# Patient Record
Sex: MALE | Race: WHITE | Employment: OTHER | URBAN - METROPOLITAN AREA
[De-identification: names, ages, dates, MRNs, and addresses within clinical notes are randomized per-mention and may not be internally consistent; named-entity substitution may affect disease eponyms.]

---

## 2017-02-09 RX ORDER — POTASSIUM CHLORIDE 20 MEQ/1
TABLET, EXTENDED RELEASE ORAL
Qty: 1080 TABLET | Refills: 1 | Status: SHIPPED | OUTPATIENT
Start: 2017-02-09 | End: 2017-02-24

## 2017-02-13 RX ORDER — SPIRONOLACTONE 25 MG/1
25 TABLET ORAL DAILY
Qty: 90 TABLET | Refills: 3 | Status: SHIPPED | OUTPATIENT
Start: 2017-02-13 | End: 2017-04-20 | Stop reason: ALTCHOICE

## 2017-02-24 RX ORDER — POTASSIUM CHLORIDE 20 MEQ/1
TABLET, EXTENDED RELEASE ORAL
Qty: 1080 TABLET | Refills: 1 | Status: SHIPPED | OUTPATIENT
Start: 2017-02-24 | End: 2017-07-19

## 2017-05-03 ENCOUNTER — OFFICE VISIT (OUTPATIENT)
Dept: NEPHROLOGY | Facility: CLINIC | Age: 62
End: 2017-05-03

## 2017-05-03 VITALS
RESPIRATION RATE: 16 BRPM | SYSTOLIC BLOOD PRESSURE: 128 MMHG | HEART RATE: 80 BPM | WEIGHT: 138 LBS | DIASTOLIC BLOOD PRESSURE: 80 MMHG | BODY MASS INDEX: 26 KG/M2

## 2017-05-03 DIAGNOSIS — E87.6 HYPOKALEMIA: ICD-10-CM

## 2017-05-03 DIAGNOSIS — E26.81 BARTTER SYNDROME (HCC): Primary | ICD-10-CM

## 2017-05-03 DIAGNOSIS — E83.39 HYPOPHOSPHATEMIA: ICD-10-CM

## 2017-05-03 PROCEDURE — 99214 OFFICE O/P EST MOD 30 MIN: CPT | Performed by: INTERNAL MEDICINE

## 2017-05-03 NOTE — PROGRESS NOTES
Nephrology Progress Note      Jen Laura is a 64year old male. HPI:   Patient presents with:  Kidney Problem      Mr. 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 Disp: 1 Box Rfl: 2   ALPRAZolam 0.25 MG Oral Tab Take 1 tablet (0.25 mg total) by mouth 3 (three) times daily as needed.  Disp: 90 tablet Rfl: 0   Potassium Chloride ER (KLOR-CON M20) 20 MEQ Oral Tab CR TAKE 3 TABLETS BY MOUTH    4 TIMES A DAY Disp: 1080 ta organomegaly  Extremities: Without clubbing, cyanosis or edema.   Neurologic: Alert and oriented, normal affect, cranial nerves grossly intact, moving all extremities  Skin: Warm and dry, no rashes      LABS:       Lab Results  Component Value Date   BUN 15

## 2017-07-19 RX ORDER — POTASSIUM CHLORIDE 20 MEQ/1
TABLET, EXTENDED RELEASE ORAL
Qty: 1080 TABLET | Refills: 1 | Status: SHIPPED | OUTPATIENT
Start: 2017-07-19 | End: 2018-01-29

## 2017-09-29 PROCEDURE — 82043 UR ALBUMIN QUANTITATIVE: CPT | Performed by: FAMILY MEDICINE

## 2017-09-29 PROCEDURE — 82570 ASSAY OF URINE CREATININE: CPT | Performed by: FAMILY MEDICINE

## 2018-01-09 PROCEDURE — 82570 ASSAY OF URINE CREATININE: CPT | Performed by: INTERNAL MEDICINE

## 2018-01-09 PROCEDURE — 82043 UR ALBUMIN QUANTITATIVE: CPT | Performed by: INTERNAL MEDICINE

## 2018-02-02 RX ORDER — SPIRONOLACTONE 25 MG/1
25 TABLET ORAL DAILY
Qty: 90 TABLET | Refills: 1 | Status: SHIPPED | OUTPATIENT
Start: 2018-02-02 | End: 2018-07-09

## 2018-02-02 RX ORDER — POTASSIUM CHLORIDE 20 MEQ/1
TABLET, EXTENDED RELEASE ORAL
Qty: 1080 TABLET | Refills: 1 | Status: SHIPPED | OUTPATIENT
Start: 2018-02-02 | End: 2018-07-09

## 2018-02-07 ENCOUNTER — OFFICE VISIT (OUTPATIENT)
Dept: NEPHROLOGY | Facility: CLINIC | Age: 63
End: 2018-02-07

## 2018-02-07 VITALS — DIASTOLIC BLOOD PRESSURE: 80 MMHG | SYSTOLIC BLOOD PRESSURE: 110 MMHG | WEIGHT: 136 LBS | BODY MASS INDEX: 26 KG/M2

## 2018-02-07 DIAGNOSIS — E83.39 HYPOPHOSPHATEMIA: ICD-10-CM

## 2018-02-07 DIAGNOSIS — E87.6 HYPOKALEMIA: ICD-10-CM

## 2018-02-07 DIAGNOSIS — E26.81 BARTTER SYNDROME (HCC): Primary | ICD-10-CM

## 2018-02-07 PROCEDURE — 99214 OFFICE O/P EST MOD 30 MIN: CPT | Performed by: INTERNAL MEDICINE

## 2018-02-07 NOTE — PROGRESS NOTES
Nephrology Progress Note      Shana Ureña is a 58year old male.     HPI:   Patient presents with:  Kidney Problem  Hypokalemia    Mr. Rosi Alan was seen in the nephrology clinic today in follow-up for management of electrolyte abnormalities related to [facility] HYDROcodone-acetaminophen  MG Oral Tab  Disp:  Rfl:    Fluticasone Propionate 50 MCG/ACT Nasal Suspension 1 spray by Nasal route daily.  Disp: 3 Bottle Rfl: 1   spironolactone 25 MG Oral Tab  Disp:  Rfl:    Albuterol Sulfate HFA (PROAIR HFA) 108 (90 Warm and dry, no rashes      LABS:       Lab Results  Component Value Date   BUN 20 09/29/2017   CREATSERUM 1.07 09/29/2017   GFR 76 11/25/2014   ALKPHO 48 09/29/2017   AST 18 09/29/2017   ALT 31 09/29/2017   BILT 0.90 09/29/2017   TP 6.9 09/29/2017   ALB

## 2018-07-09 RX ORDER — SPIRONOLACTONE 25 MG/1
25 TABLET ORAL DAILY
Qty: 90 TABLET | Refills: 1 | Status: SHIPPED | OUTPATIENT
Start: 2018-07-09 | End: 2019-01-17

## 2018-07-09 RX ORDER — POTASSIUM CHLORIDE 20 MEQ/1
60 TABLET, EXTENDED RELEASE ORAL 4 TIMES DAILY
Qty: 1080 TABLET | Refills: 1 | Status: SHIPPED | OUTPATIENT
Start: 2018-07-09 | End: 2019-01-17

## 2018-11-06 PROCEDURE — 84100 ASSAY OF PHOSPHORUS: CPT | Performed by: FAMILY MEDICINE

## 2018-11-07 ENCOUNTER — OFFICE VISIT (OUTPATIENT)
Dept: NEPHROLOGY | Facility: CLINIC | Age: 63
End: 2018-11-07
Payer: MEDICARE

## 2018-11-07 VITALS — BODY MASS INDEX: 26 KG/M2 | WEIGHT: 136 LBS | SYSTOLIC BLOOD PRESSURE: 102 MMHG | DIASTOLIC BLOOD PRESSURE: 64 MMHG

## 2018-11-07 DIAGNOSIS — E87.6 HYPOKALEMIA: ICD-10-CM

## 2018-11-07 DIAGNOSIS — E26.81 BARTTER SYNDROME (HCC): Primary | ICD-10-CM

## 2018-11-07 DIAGNOSIS — E83.39 HYPOPHOSPHATEMIA: ICD-10-CM

## 2018-11-07 PROCEDURE — 99214 OFFICE O/P EST MOD 30 MIN: CPT | Performed by: INTERNAL MEDICINE

## 2018-11-07 RX ORDER — LISINOPRIL 10 MG/1
10 TABLET ORAL DAILY
COMMUNITY
End: 2019-02-07

## 2018-11-07 NOTE — PROGRESS NOTES
Nephrology Progress Note      John Rain is a 61year old male. HPI:   Patient presents with:   Other: Bartter's Syndrome      Mr. Christiano Gimenez was seen in the nephrology clinic today in follow-up for management of electrolyte abnormalities related to [facility] Tab CR Take 3 tablets (60 mEq total) by mouth 4 (four) times daily. Disp: 1080 tablet Rfl: 1   spironolactone 25 MG Oral Tab Take 1 tablet (25 mg total) by mouth daily.  Disp: 90 tablet Rfl: 1   Pravastatin Sodium 20 MG Oral Tab Take 1 tablet (20 mg total) without wheezes, rales, rhonchi. Abdomen: Soft, non-tender. + bowel sounds, no palpable organomegaly  Extremities: Without clubbing, cyanosis or edema.   Prominence noted over the mid biceps on the right, there is mild discoloration of the area a yellowi

## 2019-01-17 RX ORDER — SPIRONOLACTONE 25 MG/1
TABLET ORAL
Qty: 90 TABLET | Refills: 1 | Status: SHIPPED | OUTPATIENT
Start: 2019-01-17 | End: 2019-08-01

## 2019-01-17 RX ORDER — POTASSIUM CHLORIDE 20 MEQ/1
TABLET, EXTENDED RELEASE ORAL
Qty: 1080 TABLET | Refills: 1 | Status: SHIPPED | OUTPATIENT
Start: 2019-01-17 | End: 2020-11-19

## 2019-07-14 RX ORDER — SPIRONOLACTONE 25 MG/1
TABLET ORAL
Qty: 90 TABLET | Refills: 0 | Status: SHIPPED | OUTPATIENT
Start: 2019-07-14 | End: 2019-09-22

## 2019-07-14 RX ORDER — POTASSIUM CHLORIDE 20 MEQ/1
TABLET, EXTENDED RELEASE ORAL
Qty: 1080 TABLET | Refills: 0 | Status: SHIPPED | OUTPATIENT
Start: 2019-07-14 | End: 2019-08-01

## 2019-08-15 ENCOUNTER — APPOINTMENT (OUTPATIENT)
Dept: LAB | Age: 64
End: 2019-08-15
Attending: INTERNAL MEDICINE
Payer: MEDICARE

## 2019-08-15 DIAGNOSIS — I10 ESSENTIAL HYPERTENSION: ICD-10-CM

## 2019-08-15 DIAGNOSIS — E26.81 BARTTER SYNDROME (HCC): ICD-10-CM

## 2019-08-15 DIAGNOSIS — E87.6 HYPOKALEMIA: ICD-10-CM

## 2019-08-15 DIAGNOSIS — E83.39 HYPOPHOSPHATEMIA: ICD-10-CM

## 2019-08-15 DIAGNOSIS — E78.5 DYSLIPIDEMIA: ICD-10-CM

## 2019-08-15 LAB
ALBUMIN SERPL-MCNC: 3.5 G/DL (ref 3.4–5)
ALBUMIN/GLOB SERPL: 1.1 {RATIO} (ref 1–2)
ALP LIVER SERPL-CCNC: 52 U/L (ref 45–117)
ALT SERPL-CCNC: 40 U/L (ref 16–61)
ANION GAP SERPL CALC-SCNC: 6 MMOL/L (ref 0–18)
AST SERPL-CCNC: 20 U/L (ref 15–37)
BILIRUB SERPL-MCNC: 0.6 MG/DL (ref 0.1–2)
BUN BLD-MCNC: 23 MG/DL (ref 7–18)
BUN/CREAT SERPL: 20.7 (ref 10–20)
CALCIUM BLD-MCNC: 8.9 MG/DL (ref 8.5–10.1)
CHLORIDE SERPL-SCNC: 105 MMOL/L (ref 98–112)
CO2 SERPL-SCNC: 29 MMOL/L (ref 21–32)
CREAT BLD-MCNC: 1.11 MG/DL (ref 0.7–1.3)
GLOBULIN PLAS-MCNC: 3.3 G/DL (ref 2.8–4.4)
GLUCOSE BLD-MCNC: 88 MG/DL (ref 70–99)
HAV IGM SER QL: 1.2 MG/DL (ref 1.6–2.6)
M PROTEIN MFR SERPL ELPH: 6.8 G/DL (ref 6.4–8.2)
OSMOLALITY SERPL CALC.SUM OF ELEC: 293 MOSM/KG (ref 275–295)
PHOSPHATE SERPL-MCNC: 2.6 MG/DL (ref 2.5–4.9)
POTASSIUM SERPL-SCNC: 3.6 MMOL/L (ref 3.5–5.1)
SODIUM SERPL-SCNC: 140 MMOL/L (ref 136–145)

## 2019-08-15 PROCEDURE — 80053 COMPREHEN METABOLIC PANEL: CPT

## 2019-08-15 PROCEDURE — 80061 LIPID PANEL: CPT

## 2019-08-15 PROCEDURE — 84100 ASSAY OF PHOSPHORUS: CPT

## 2019-08-15 PROCEDURE — 83735 ASSAY OF MAGNESIUM: CPT

## 2019-08-21 ENCOUNTER — OFFICE VISIT (OUTPATIENT)
Dept: NEPHROLOGY | Facility: CLINIC | Age: 64
End: 2019-08-21
Payer: MEDICARE

## 2019-08-21 VITALS — BODY MASS INDEX: 24 KG/M2 | WEIGHT: 132 LBS | DIASTOLIC BLOOD PRESSURE: 72 MMHG | SYSTOLIC BLOOD PRESSURE: 116 MMHG

## 2019-08-21 DIAGNOSIS — E87.6 HYPOKALEMIA: ICD-10-CM

## 2019-08-21 DIAGNOSIS — E83.39 HYPOPHOSPHATEMIA: ICD-10-CM

## 2019-08-21 DIAGNOSIS — E26.81 BARTTER SYNDROME (HCC): Primary | ICD-10-CM

## 2019-08-21 PROCEDURE — 99214 OFFICE O/P EST MOD 30 MIN: CPT | Performed by: INTERNAL MEDICINE

## 2019-08-21 NOTE — PROGRESS NOTES
Nephrology Progress Note      Jhonathan Xiong is a 59year old male. HPI:   Patient presents with:   Other: Bartter's Syndrome      Mr. Joseph Machuca was seen in the nephrology clinic today in follow-up for management of electro light abnormalities related to Bartter Disp: 90 tablet Rfl: 3   KLOR-CON M20 20 MEQ Oral Tab CR TAKE 3 TABLETS 4 TIMES A   DAY Disp: 1080 tablet Rfl: 1   Magnesium Chloride 64 MG Oral Tab EC Take 2 tablets by mouth 4 (four) times daily.    Disp:  Rfl:    baclofen 10 MG Oral Tab Take 0.5 tablets 76 11/25/2014    GFRNAA 70 08/15/2019    GFRAA 81 08/15/2019    CA 8.9 08/15/2019    OSMOCALC 293 08/15/2019    ALKPHO 52 08/15/2019    AST 20 08/15/2019    ALT 40 08/15/2019    BILT 0.6 08/15/2019    TP 6.8 08/15/2019    ALB 3.5 08/15/2019    GLOBULIN 3.3

## 2019-09-23 RX ORDER — POTASSIUM CHLORIDE 20 MEQ/1
TABLET, EXTENDED RELEASE ORAL
Qty: 1080 TABLET | Refills: 1 | Status: SHIPPED | OUTPATIENT
Start: 2019-09-23 | End: 2020-02-18

## 2019-09-23 RX ORDER — SPIRONOLACTONE 25 MG/1
TABLET ORAL
Qty: 90 TABLET | Refills: 1 | Status: SHIPPED | OUTPATIENT
Start: 2019-09-23 | End: 2020-08-19

## 2019-11-15 PROBLEM — E26.81 BARTTER'S SYNDROME (HCC): Status: ACTIVE | Noted: 2019-11-15

## 2020-05-12 DIAGNOSIS — E26.81: Primary | ICD-10-CM

## 2020-05-18 ENCOUNTER — VIRTUAL PHONE E/M (OUTPATIENT)
Dept: NEPHROLOGY | Facility: CLINIC | Age: 65
End: 2020-05-18
Payer: MEDICARE

## 2020-05-18 DIAGNOSIS — E83.39 HYPOPHOSPHATEMIA: ICD-10-CM

## 2020-05-18 DIAGNOSIS — E26.81 BARTTER SYNDROME (HCC): Primary | ICD-10-CM

## 2020-05-18 DIAGNOSIS — E87.6 HYPOKALEMIA: ICD-10-CM

## 2020-05-18 PROCEDURE — 99442 PHONE E/M BY PHYS 11-20 MIN: CPT | Performed by: INTERNAL MEDICINE

## 2020-05-18 NOTE — PROGRESS NOTES
I called pt this AM and performed virtual office visit. Time spent 15 minutes. He reports that he has been feeling very well overall. No recent medication changes. Labs reviewed and K is stable. Mag and phos not done.   Advised to continue current meds

## 2020-08-19 RX ORDER — SPIRONOLACTONE 25 MG/1
25 TABLET ORAL DAILY
Qty: 30 TABLET | Refills: 0 | Status: SHIPPED | OUTPATIENT
Start: 2020-08-19 | End: 2020-09-10

## 2020-09-10 RX ORDER — SPIRONOLACTONE 25 MG/1
25 TABLET ORAL DAILY
Qty: 90 TABLET | Refills: 1 | Status: SHIPPED | OUTPATIENT
Start: 2020-09-10 | End: 2020-09-23

## 2020-09-23 RX ORDER — SPIRONOLACTONE 25 MG/1
25 TABLET ORAL DAILY
Qty: 90 TABLET | Refills: 1 | Status: SHIPPED | OUTPATIENT
Start: 2020-09-23 | End: 2021-04-05

## 2020-11-19 RX ORDER — POTASSIUM CHLORIDE 20 MEQ/1
TABLET, EXTENDED RELEASE ORAL
Qty: 1080 TABLET | Refills: 1 | Status: SHIPPED | OUTPATIENT
Start: 2020-11-19 | End: 2021-05-12

## 2021-04-05 RX ORDER — SPIRONOLACTONE 25 MG/1
TABLET ORAL
Qty: 90 TABLET | Refills: 1 | Status: SHIPPED | OUTPATIENT
Start: 2021-04-05 | End: 2021-04-23

## 2021-04-28 ENCOUNTER — TELEPHONE (OUTPATIENT)
Dept: NEPHROLOGY | Facility: CLINIC | Age: 66
End: 2021-04-28

## 2021-04-29 ENCOUNTER — TELEPHONE (OUTPATIENT)
Dept: NEPHROLOGY | Facility: CLINIC | Age: 66
End: 2021-04-29

## 2021-04-29 NOTE — TELEPHONE ENCOUNTER
Called and LVM to advise that per Dr. Blanca Ballard no additional labs are needed.  March labs are sufficient

## 2021-05-03 ENCOUNTER — OFFICE VISIT (OUTPATIENT)
Dept: NEPHROLOGY | Facility: CLINIC | Age: 66
End: 2021-05-03
Payer: MEDICARE

## 2021-05-03 VITALS — WEIGHT: 143 LBS | SYSTOLIC BLOOD PRESSURE: 112 MMHG | DIASTOLIC BLOOD PRESSURE: 72 MMHG | BODY MASS INDEX: 26 KG/M2

## 2021-05-03 DIAGNOSIS — E26.81 BARTTER SYNDROME (HCC): Primary | ICD-10-CM

## 2021-05-03 DIAGNOSIS — E83.39 HYPOPHOSPHATEMIA: ICD-10-CM

## 2021-05-03 DIAGNOSIS — E87.6 HYPOKALEMIA: ICD-10-CM

## 2021-05-03 PROCEDURE — 99214 OFFICE O/P EST MOD 30 MIN: CPT | Performed by: INTERNAL MEDICINE

## 2021-05-03 NOTE — PROGRESS NOTES
Nephrology Progress Note      Marilu You is a 72year old male. HPI:   Patient presents with:   Other: Bartter Syndrome, hypophosphatemia  Hypokalemia      Mr. Pichardo Evelia was seen in the nephrology clinic today in follow-up for management of electrolyte abnor tablet (0.25 mg total) by mouth 3 (three) times daily as needed. 90 tablet 0   • Levocetirizine Dihydrochloride 5 MG Oral Tab Take 1 tablet (5 mg total) by mouth every evening.  90 tablet 0   • KLOR-CON M20 20 MEQ Oral Tab CR TAKE 3 TABLETS 4 TIMES A   DAY rhonchi. Abdomen: Soft, non-tender. + bowel sounds, no palpable organomegaly  Extremities: Without clubbing, cyanosis or edema.   Neurologic: Alert and oriented, normal affect, cranial nerves grossly intact, moving all extremities  Skin: Warm and dry, no and renal protective effects and we will continue to take this medication as prescribed.       Blayne Lawson MD  5/3/2021  10:56 AM

## 2021-05-12 RX ORDER — POTASSIUM CHLORIDE 20 MEQ/1
60 TABLET, EXTENDED RELEASE ORAL 4 TIMES DAILY
Qty: 1080 TABLET | Refills: 1 | Status: SHIPPED | OUTPATIENT
Start: 2021-05-12 | End: 2021-06-19

## 2021-06-19 RX ORDER — POTASSIUM CHLORIDE 20 MEQ/1
60 TABLET, EXTENDED RELEASE ORAL 4 TIMES DAILY
Qty: 1080 TABLET | Refills: 1 | Status: SHIPPED | OUTPATIENT
Start: 2021-06-19 | End: 2022-01-19

## 2021-11-23 RX ORDER — SPIRONOLACTONE 25 MG/1
25 TABLET ORAL DAILY
Qty: 90 TABLET | Refills: 1 | Status: SHIPPED | OUTPATIENT
Start: 2021-11-23 | End: 2022-01-19

## 2021-11-30 RX ORDER — MAGNESIUM CHLORIDE 64 MG
2 TABLET, DELAYED RELEASE (ENTERIC COATED) ORAL 4 TIMES DAILY
Qty: 720 TABLET | Refills: 1 | Status: SHIPPED | OUTPATIENT
Start: 2021-11-30

## 2022-01-19 DIAGNOSIS — I10 ESSENTIAL HYPERTENSION: ICD-10-CM

## 2022-01-19 DIAGNOSIS — E87.6 HYPOKALEMIA: ICD-10-CM

## 2022-01-19 DIAGNOSIS — E83.39 HYPOPHOSPHATEMIA: ICD-10-CM

## 2022-01-19 DIAGNOSIS — E26.81 BARTTER SYNDROME (HCC): Primary | ICD-10-CM

## 2022-01-19 RX ORDER — SPIRONOLACTONE 25 MG/1
25 TABLET ORAL DAILY
Qty: 90 TABLET | Refills: 1 | Status: SHIPPED | OUTPATIENT
Start: 2022-01-19 | End: 2022-07-18

## 2022-01-19 RX ORDER — POTASSIUM CHLORIDE 20 MEQ/1
60 TABLET, EXTENDED RELEASE ORAL 4 TIMES DAILY
Qty: 1080 TABLET | Refills: 1 | Status: SHIPPED | OUTPATIENT
Start: 2022-01-19

## 2022-01-19 NOTE — TELEPHONE ENCOUNTER
Pt is calling requesting refills on potassium chloride er 20 MEQ and spironalactone 25 MG, he would like 90 day supply to be sent to [facility].  Sent to Dr Poncho Hankins for approval.

## 2022-05-02 ENCOUNTER — OFFICE VISIT (OUTPATIENT)
Dept: NEPHROLOGY | Facility: CLINIC | Age: 67
End: 2022-05-02
Payer: COMMERCIAL

## 2022-05-02 VITALS — DIASTOLIC BLOOD PRESSURE: 76 MMHG | WEIGHT: 143 LBS | BODY MASS INDEX: 26 KG/M2 | SYSTOLIC BLOOD PRESSURE: 130 MMHG

## 2022-05-02 DIAGNOSIS — E83.39 HYPOPHOSPHATEMIA: ICD-10-CM

## 2022-05-02 DIAGNOSIS — I10 ESSENTIAL HYPERTENSION: ICD-10-CM

## 2022-05-02 DIAGNOSIS — E87.6 HYPOKALEMIA: ICD-10-CM

## 2022-05-02 DIAGNOSIS — E26.81 BARTTER SYNDROME (HCC): Primary | ICD-10-CM

## 2022-05-02 RX ORDER — ALPRAZOLAM 0.25 MG/1
0.25 TABLET ORAL NIGHTLY PRN
COMMUNITY

## 2022-05-02 RX ORDER — ASPIRIN 81 MG/1
81 TABLET ORAL DAILY
COMMUNITY

## 2022-05-02 RX ORDER — LISINOPRIL 5 MG/1
5 TABLET ORAL DAILY
COMMUNITY

## 2022-05-27 DIAGNOSIS — E26.81 BARTTER SYNDROME (HCC): ICD-10-CM

## 2022-05-27 DIAGNOSIS — I10 ESSENTIAL HYPERTENSION: ICD-10-CM

## 2022-05-27 DIAGNOSIS — E87.6 HYPOKALEMIA: ICD-10-CM

## 2022-05-27 RX ORDER — POTASSIUM CHLORIDE 20 MEQ/1
60 TABLET, EXTENDED RELEASE ORAL 4 TIMES DAILY
Qty: 1080 TABLET | Refills: 3 | Status: SHIPPED | OUTPATIENT
Start: 2022-05-27

## 2022-05-27 RX ORDER — SPIRONOLACTONE 25 MG/1
25 TABLET ORAL DAILY
Qty: 90 TABLET | Refills: 3 | Status: SHIPPED | OUTPATIENT
Start: 2022-05-27

## 2023-05-01 ENCOUNTER — OFFICE VISIT (OUTPATIENT)
Dept: NEPHROLOGY | Facility: CLINIC | Age: 68
End: 2023-05-01
Payer: COMMERCIAL

## 2023-05-01 VITALS — BODY MASS INDEX: 26 KG/M2 | SYSTOLIC BLOOD PRESSURE: 112 MMHG | WEIGHT: 141.5 LBS | DIASTOLIC BLOOD PRESSURE: 70 MMHG

## 2023-05-01 DIAGNOSIS — I10 ESSENTIAL HYPERTENSION: ICD-10-CM

## 2023-05-01 DIAGNOSIS — E87.6 HYPOKALEMIA: ICD-10-CM

## 2023-05-01 DIAGNOSIS — E26.81 BARTTER SYNDROME (HCC): Primary | ICD-10-CM

## 2023-05-01 DIAGNOSIS — E83.39 HYPOPHOSPHATEMIA: ICD-10-CM

## 2023-06-17 DIAGNOSIS — I10 ESSENTIAL HYPERTENSION: ICD-10-CM

## 2023-06-17 DIAGNOSIS — E26.81 BARTTER SYNDROME (HCC): ICD-10-CM

## 2023-06-17 DIAGNOSIS — E87.6 HYPOKALEMIA: ICD-10-CM

## 2023-06-20 RX ORDER — POTASSIUM CHLORIDE 20 MEQ/1
60 TABLET, EXTENDED RELEASE ORAL 4 TIMES DAILY
Qty: 1080 TABLET | Refills: 1 | Status: SHIPPED | OUTPATIENT
Start: 2023-06-20

## 2023-06-20 RX ORDER — SPIRONOLACTONE 25 MG/1
25 TABLET ORAL DAILY
Qty: 90 TABLET | Refills: 1 | Status: SHIPPED | OUTPATIENT
Start: 2023-06-20

## 2023-06-22 ENCOUNTER — TELEPHONE (OUTPATIENT)
Dept: NEPHROLOGY | Facility: CLINIC | Age: 68
End: 2023-06-22

## 2023-06-22 DIAGNOSIS — E83.42 HYPOMAGNESEMIA: ICD-10-CM

## 2023-06-22 DIAGNOSIS — E87.6 HYPOKALEMIA: Primary | ICD-10-CM

## 2023-07-06 ENCOUNTER — MED REC SCAN ONLY (OUTPATIENT)
Dept: NEPHROLOGY | Facility: CLINIC | Age: 68
End: 2023-07-06

## 2023-07-31 ENCOUNTER — TELEPHONE (OUTPATIENT)
Dept: NEPHROLOGY | Facility: CLINIC | Age: 68
End: 2023-07-31

## 2023-07-31 NOTE — TELEPHONE ENCOUNTER
I called pt this afternoon. We discussed the results of labs done in early July. He has been feeling \"wiped out\" at times of late. That being said the electrolytes are relatively stable with nl range k and mag consistently a bit low, 1.3 and 1.4 meq/l. Will cont current electrolyte replacement.

## 2023-11-21 DIAGNOSIS — I10 ESSENTIAL HYPERTENSION: ICD-10-CM

## 2023-11-21 DIAGNOSIS — E87.6 HYPOKALEMIA: ICD-10-CM

## 2023-11-21 DIAGNOSIS — E26.81 BARTTER SYNDROME (HCC): ICD-10-CM

## 2023-11-21 RX ORDER — SPIRONOLACTONE 25 MG/1
25 TABLET ORAL DAILY
Qty: 100 TABLET | Refills: 2 | OUTPATIENT
Start: 2023-11-21

## 2023-11-21 RX ORDER — POTASSIUM CHLORIDE 20 MEQ/1
60 TABLET, EXTENDED RELEASE ORAL 4 TIMES DAILY
Refills: 2 | OUTPATIENT
Start: 2023-11-21

## 2024-01-17 DIAGNOSIS — E87.6 HYPOKALEMIA: ICD-10-CM

## 2024-01-17 DIAGNOSIS — E26.81 BARTTER SYNDROME (HCC): ICD-10-CM

## 2024-01-17 DIAGNOSIS — I10 ESSENTIAL HYPERTENSION: ICD-10-CM

## 2024-01-17 RX ORDER — SPIRONOLACTONE 25 MG/1
25 TABLET ORAL DAILY
Qty: 80 TABLET | Refills: 3 | Status: SHIPPED | OUTPATIENT
Start: 2024-01-17

## 2024-01-17 RX ORDER — POTASSIUM CHLORIDE 20 MEQ/1
60 TABLET, EXTENDED RELEASE ORAL 4 TIMES DAILY
Qty: 960 TABLET | Refills: 3 | Status: SHIPPED | OUTPATIENT
Start: 2024-01-17

## 2024-05-02 ENCOUNTER — OFFICE VISIT (OUTPATIENT)
Dept: NEPHROLOGY | Facility: CLINIC | Age: 69
End: 2024-05-02
Payer: COMMERCIAL

## 2024-05-02 VITALS — DIASTOLIC BLOOD PRESSURE: 66 MMHG | BODY MASS INDEX: 27 KG/M2 | WEIGHT: 147.5 LBS | SYSTOLIC BLOOD PRESSURE: 112 MMHG

## 2024-05-02 DIAGNOSIS — E87.6 HYPOKALEMIA: ICD-10-CM

## 2024-05-02 DIAGNOSIS — I10 ESSENTIAL HYPERTENSION: ICD-10-CM

## 2024-05-02 DIAGNOSIS — E26.81 BARTTER SYNDROME (HCC): Primary | ICD-10-CM

## 2024-05-02 DIAGNOSIS — E83.42 HYPOMAGNESEMIA: ICD-10-CM

## 2024-05-02 PROCEDURE — 99214 OFFICE O/P EST MOD 30 MIN: CPT | Performed by: INTERNAL MEDICINE

## 2024-05-02 NOTE — PROGRESS NOTES
Nephrology Progress Note      Danilo Nova is a 68 year old male.    HPI:     Chief Complaint   Patient presents with    Follow - Up     Bartter syndrome     Hypertension       Mr. 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  Since his last clinic visit he has felt relatively well and has not had any recent illnesses, hospitalizations or significant ongoing complaints.  Review of systems is completed and is unremarkable.      HISTORY:  Past Medical History:    Allergic rhinitis    Asthma (HCC)    Essential hypertension    Hyperlipidemia    Osteoarthritis      Past Surgical History:   Procedure Laterality Date    Colonoscopy      Dr. Sam mullins    Colonoscopy  07/15/2019    Inguinal hernia repair Bilateral     Trigger finger release Right 09/2022      Family History   Problem Relation Age of Onset    Skin cancer Mother     High Blood Pressure Mother     Other (lung cancer) Mother     Diabetes Father     Cancer Father         bladder    Heart Attack Other         2 cousins      Social History:   Social History     Socioeconomic History    Marital status:    Tobacco Use    Smoking status: Never    Smokeless tobacco: Never   Vaping Use    Vaping status: Never Used   Substance and Sexual Activity    Alcohol use: Yes     Alcohol/week: 0.0 standard drinks of alcohol     Comment: socially    Drug use: No        Medications (Active prior to today's visit):  Current Outpatient Medications   Medication Sig Dispense Refill    POTASSIUM CHLORIDE 20 MEQ Oral Tab CR TAKE 3 TABLETS BY MOUTH 4 TIMES  DAILY 960 tablet 3    SPIRONOLACTONE 25 MG Oral Tab TAKE 1 TABLET BY MOUTH DAILY 80 tablet 3    ALPRAZolam 0.25 MG Oral Tab Take 1 tablet (0.25 mg total) by mouth nightly as needed.      lisinopril 5 MG Oral Tab Take 1 tablet (5 mg total) by mouth daily.      aspirin 81 MG Oral Tab EC Take 1 tablet (81 mg total) by mouth daily.       LEVOCETIRIZINE 5 MG Oral Tab TAKE 1 TABLET BY MOUTH IN  THE EVENING 90 tablet 0    PRAVASTATIN 20 MG Oral Tab TAKE 1 TABLET BY MOUTH AT  NIGHT 90 tablet 0    ALBUTEROL 108 (90 Base) MCG/ACT Inhalation Aero Soln USE 2 INHALATIONS BY MOUTH  EVERY 6 HOURS AS NEEDED FOR WHEEZING 3 each 0    fluticasone propionate 50 MCG/ACT Nasal Suspension 1 spray by Nasal route daily. 48 mL 3    Magnesium Chloride 64 MG Oral Tab EC Take 2 tablets by mouth 4 (four) times daily. 720 tablet 1    HYDROcodone-acetaminophen  MG Oral Tab TAKE 1 TABLET BY MOUTH EVERY 12 HOURS  0    baclofen 10 MG Oral Tab Take 10 mg by mouth daily. (Patient not taking: Reported on 5/1/2023)  0    Omega-3 Fatty Acids (FISH OIL OR) Take 2,000 mg by mouth daily.        Cholecalciferol (VITAMIN D) 2000 UNITS Oral Tab Take 1 tablet by mouth daily.         Allergies:  Allergies   Allergen Reactions    Levaquin [Levofloxacin]     Penicillins          ROS:     Denies fever/chills  Denies wt loss/gain  Denies HA or visual changes  Denies CP or palpitations  Denies SOB/cough/hemoptysis  Denies abd or flank pain  Denies N/V/D  Denies change in urinary habits or gross hematuria  Denies LE edema  Denies skin rashes/myalgias/arthralgias      PHYSICAL EXAM:   /66 (BP Location: Left arm, Patient Position: Sitting)   Wt 147 lb 8 oz (66.9 kg)   BMI 26.98 kg/m²   Wt Readings from Last 6 Encounters:   05/02/24 147 lb 8 oz (66.9 kg)   05/01/23 141 lb 8 oz (64.2 kg)   05/02/22 143 lb (64.9 kg)   01/13/22 146 lb (66.2 kg)   08/30/21 139 lb (63 kg)   05/27/21 139 lb (63 kg)       General: Alert and oriented in no apparent distress.  HEENT: No scleral icterus, MMM  Neck: Supple, no VALORIE or thyromegaly  Cardiac: Regular rate and rhythm, S1, S2 normal, no murmur or rub  Lungs: Clear without wheezes, rales, rhonchi.    Extremities: Without clubbing, cyanosis or edema.  Neurologic: Alert and oriented, normal affect, cranial nerves grossly intact, moving all  extremities  Skin: Warm and dry, no rashes      LABS:     Labs reviewed from April 26 and include potassium 4.1 mill equivalents per liter, creatinine 0.88 mg/dL  Magnesium 1.3    ASSESSMENT/PLAN:     #1.  Hypokalemia-he has chronic hypokalemia as well as hypomagnesemia and hypophosphatemia related to underlying Bartter's syndrome.  Electrolytes are stable with his current supplementation and overall he has been doing quite well.  Regarding his hypomagnesemia he did back off on his supplementation somewhat due to issues with diarrhea and loose stools and he remained stable in this regard.    #2.  Hypertension-blood pressure has been mildly elevated but has been under good control on low-dose lisinopril at 5 mg daily      Chad Ordaz MD  5/2/2024  9:17 AM         Detail Level: Detailed

## 2024-10-28 DIAGNOSIS — E87.6 HYPOKALEMIA: Primary | ICD-10-CM

## 2024-10-28 DIAGNOSIS — I10 ESSENTIAL HYPERTENSION: ICD-10-CM

## 2024-11-02 DIAGNOSIS — E87.6 HYPOKALEMIA: ICD-10-CM

## 2024-11-02 DIAGNOSIS — E26.81 BARTTER SYNDROME (HCC): ICD-10-CM

## 2024-11-02 DIAGNOSIS — I10 ESSENTIAL HYPERTENSION: ICD-10-CM

## 2024-11-04 RX ORDER — POTASSIUM CHLORIDE 1500 MG/1
60 TABLET, EXTENDED RELEASE ORAL 4 TIMES DAILY
Qty: 1080 TABLET | Refills: 3 | Status: SHIPPED | OUTPATIENT
Start: 2024-11-04

## 2024-11-04 RX ORDER — SPIRONOLACTONE 25 MG/1
25 TABLET ORAL DAILY
Qty: 100 TABLET | Refills: 2 | Status: SHIPPED | OUTPATIENT
Start: 2024-11-04

## 2025-05-01 LAB
BUN: 19 MG/DL (ref 7–25)
CALCIUM: 9.3 MG/DL (ref 8.6–10.3)
CARBON DIOXIDE: 25 MMOL/L (ref 20–32)
CHLORIDE: 104 MMOL/L (ref 98–110)
CREATININE: 0.93 MG/DL (ref 0.7–1.35)
EGFR: 89 ML/MIN/1.73M2
GLUCOSE: 92 MG/DL (ref 65–99)
MAGNESIUM: 1.5 MG/DL (ref 1.5–2.5)
PHOSPHATE (AS PHOSPHORUS): 3.1 MG/DL (ref 2.1–4.3)
POTASSIUM: 4.4 MMOL/L (ref 3.5–5.3)
SODIUM: 138 MMOL/L (ref 135–146)

## 2025-05-05 ENCOUNTER — OFFICE VISIT (OUTPATIENT)
Dept: NEPHROLOGY | Facility: CLINIC | Age: 70
End: 2025-05-05
Payer: COMMERCIAL

## 2025-05-05 VITALS — WEIGHT: 147.38 LBS | DIASTOLIC BLOOD PRESSURE: 78 MMHG | BODY MASS INDEX: 27 KG/M2 | SYSTOLIC BLOOD PRESSURE: 116 MMHG

## 2025-05-05 DIAGNOSIS — I10 ESSENTIAL HYPERTENSION: ICD-10-CM

## 2025-05-05 DIAGNOSIS — E26.81 BARTTER SYNDROME (HCC): Primary | ICD-10-CM

## 2025-05-05 DIAGNOSIS — E83.42 HYPOMAGNESEMIA: ICD-10-CM

## 2025-05-05 DIAGNOSIS — E87.6 HYPOKALEMIA: ICD-10-CM

## 2025-05-05 PROCEDURE — 99214 OFFICE O/P EST MOD 30 MIN: CPT | Performed by: INTERNAL MEDICINE

## 2025-05-05 NOTE — PROGRESS NOTES
Nephrology Progress Note      Danilo Nova is a 69 year old male.    HPI:   No chief complaint on file.      Mr. 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  Since his last clinic visit he has felt well and has not had any recent illnesses, hospitalizations or significant ongoing complaints.  Review of systems completed unremarkable      HISTORY:  Past Medical History[1]   Past Surgical History[2]   Family History[3]   Social History: Short Social Hx on File[4]     Medications (Active prior to today's visit):  Current Medications[5]    Allergies:  Allergies[6]      ROS:     Denies fever/chills  Denies wt loss/gain  Denies HA or visual changes  Denies CP or palpitations  Denies SOB/cough/hemoptysis  Denies abd or flank pain  Denies N/V/D  Denies change in urinary habits or gross hematuria  Denies LE edema  Denies skin rashes/myalgias/arthralgias      PHYSICAL EXAM:   There were no vitals taken for this visit.  Wt Readings from Last 6 Encounters:   05/02/24 147 lb 8 oz (66.9 kg)   05/01/23 141 lb 8 oz (64.2 kg)   05/02/22 143 lb (64.9 kg)   01/13/22 146 lb (66.2 kg)   08/30/21 139 lb (63 kg)   05/27/21 139 lb (63 kg)       General: Alert and oriented in no apparent distress.  HEENT: No scleral icterus, MMM  Neck: Supple, no VALORIE or thyromegaly  Cardiac: Regular rate and rhythm, S1, S2 normal, no murmur or rub  Lungs: Clear without wheezes, rales, rhonchi.    Extremities: Without clubbing, cyanosis or edema.  Neurologic: Alert and oriented, normal affect, cranial nerves grossly intact, moving all extremities  Skin: Warm and dry, no rashes      LABS:     Lab Results   Component Value Date    BUN 19 04/30/2025    BUNCREA SEE NOTE: 04/30/2025    CREATSERUM 0.93 04/30/2025    ANIONGAP 6 08/15/2019    GFR 76 11/25/2014    GFRNAA 70 08/15/2019    GFRAA 81 08/15/2019    CA 9.3 04/30/2025    OSMOCALC 293 08/15/2019    ALKPHO 55  08/20/2021    AST 21 08/20/2021    ALT 27 08/20/2021    BILT 0.72 08/20/2021    TP 6.6 08/20/2021    ALB 4.3 08/20/2021    GLOBULIN 3.3 08/15/2019    AGRATIO 2.2 07/05/2016     04/30/2025    K 4.4 04/30/2025     04/30/2025    CO2 25 04/30/2025     Lab Results   Component Value Date    RBC 5.08 04/26/2018    HGB 15.9 04/26/2018    HCT 43.7 04/26/2018     04/26/2018    MCV 86.0 04/26/2018    MCH 31.3 04/26/2018    MCHC 36.4 04/26/2018    RDW 12.2 04/26/2018     Lab Results   Component Value Date    CREUR 100.95 03/02/2021     Magnesium level 1.4  Phosphorus level 2.7      ASSESSMENT/PLAN:       #1.  Hypokalemia-he has chronic hypokalemia as well as hypomagnesemia and hypophosphatemia related to underlying Bartter's syndrome.  Electrolytes are stable with his current supplementation and overall he has been doing quite well.  Regarding his hypomagnesemia he did back off on his supplementation somewhat due to issues with diarrhea and loose stools and he remained stable in this regard.     #2.  Hypertension-blood pressure has been mildly elevated but has been under good control on low-dose lisinopril at 5 mg daily       Chad Ordaz MD  5/5/2025  9:38 AM             [1]   Past Medical History:   Allergic rhinitis    Asthma (HCC)    Essential hypertension    Hyperlipidemia    Osteoarthritis   [2]   Past Surgical History:  Procedure Laterality Date    Colonoscopy      Dr. Sam mullins    Colonoscopy  07/15/2019    Inguinal hernia repair Bilateral     Trigger finger release Right 09/2022   [3]   Family History  Problem Relation Age of Onset    Skin cancer Mother     High Blood Pressure Mother     Other (lung cancer) Mother     Diabetes Father     Cancer Father         bladder    Heart Attack Other         2 cousins   [4]   Social History  Socioeconomic History    Marital status:    Tobacco Use    Smoking status: Never    Smokeless tobacco: Never   Vaping Use    Vaping status: Never Used    Substance and Sexual Activity    Alcohol use: Yes     Alcohol/week: 0.0 standard drinks of alcohol     Comment: socially    Drug use: No   [5]   Current Outpatient Medications   Medication Sig Dispense Refill    SPIRONOLACTONE 25 MG Oral Tab TAKE 1 TABLET BY MOUTH DAILY 100 tablet 2    potassium chloride 20 MEQ Oral Tab CR TAKE 3 TABLETS BY MOUTH 4 TIMES  DAILY 1080 tablet 3    ALPRAZolam 0.25 MG Oral Tab Take 1 tablet (0.25 mg total) by mouth nightly as needed.      lisinopril 5 MG Oral Tab Take 1 tablet (5 mg total) by mouth daily.      aspirin 81 MG Oral Tab EC Take 1 tablet (81 mg total) by mouth daily.      LEVOCETIRIZINE 5 MG Oral Tab TAKE 1 TABLET BY MOUTH IN  THE EVENING 90 tablet 0    PRAVASTATIN 20 MG Oral Tab TAKE 1 TABLET BY MOUTH AT  NIGHT 90 tablet 0    ALBUTEROL 108 (90 Base) MCG/ACT Inhalation Aero Soln USE 2 INHALATIONS BY MOUTH  EVERY 6 HOURS AS NEEDED FOR WHEEZING 3 each 0    fluticasone propionate 50 MCG/ACT Nasal Suspension 1 spray by Nasal route daily. 48 mL 3    Magnesium Chloride 64 MG Oral Tab EC Take 2 tablets by mouth 4 (four) times daily. 720 tablet 1    HYDROcodone-acetaminophen  MG Oral Tab TAKE 1 TABLET BY MOUTH EVERY 12 HOURS  0    baclofen 10 MG Oral Tab Take 10 mg by mouth daily. (Patient not taking: Reported on 5/1/2023)  0    Omega-3 Fatty Acids (FISH OIL OR) Take 2,000 mg by mouth daily.        Cholecalciferol (VITAMIN D) 2000 UNITS Oral Tab Take 1 tablet by mouth daily.     [6]   Allergies  Allergen Reactions    Levaquin [Levofloxacin]     Penicillins

## (undated) NOTE — MR AVS SNAPSHOT
[address], [address] [phone]               Thank you for choosing us for your health care visit with Robbie Acosta MD.  We are glad to serve you and happy to provide you with [facility] Today's Vital Signs     BP Pulse Weight             128/80 mmHg 80 138 lb            Current Medications          This list is accurate as of: 5/3/17  1:28 PM.  Always use your most recent med list.                * albuterol sulfate (2.5 MG/3 * Notice: This list has 2 medication(s) that are the same as other medications prescribed for you. Read the directions carefully, and ask your doctor or other care provider to review them with you.             Myriam     Sign up for [employer], your secure